# Patient Record
Sex: MALE | Race: ASIAN | NOT HISPANIC OR LATINO
[De-identification: names, ages, dates, MRNs, and addresses within clinical notes are randomized per-mention and may not be internally consistent; named-entity substitution may affect disease eponyms.]

---

## 2019-01-09 ENCOUNTER — APPOINTMENT (OUTPATIENT)
Dept: OPHTHALMOLOGY | Facility: CLINIC | Age: 64
End: 2019-01-09
Payer: COMMERCIAL

## 2019-01-09 DIAGNOSIS — H53.149 VISUAL DISCOMFORT, UNSPECIFIED: ICD-10-CM

## 2019-01-09 DIAGNOSIS — H25.13 AGE-RELATED NUCLEAR CATARACT, BILATERAL: ICD-10-CM

## 2019-01-09 DIAGNOSIS — H40.003 PREGLAUCOMA, UNSPECIFIED, BILATERAL: ICD-10-CM

## 2019-01-09 PROCEDURE — 92133 CPTRZD OPH DX IMG PST SGM ON: CPT

## 2019-01-09 PROCEDURE — 92004 COMPRE OPH EXAM NEW PT 1/>: CPT

## 2020-01-03 ENCOUNTER — RECORD ABSTRACTING (OUTPATIENT)
Age: 65
End: 2020-01-03

## 2020-01-03 DIAGNOSIS — Z80.0 FAMILY HISTORY OF MALIGNANT NEOPLASM OF DIGESTIVE ORGANS: ICD-10-CM

## 2020-01-03 DIAGNOSIS — Z87.19 PERSONAL HISTORY OF OTHER DISEASES OF THE DIGESTIVE SYSTEM: ICD-10-CM

## 2020-01-03 DIAGNOSIS — Z87.891 PERSONAL HISTORY OF NICOTINE DEPENDENCE: ICD-10-CM

## 2020-01-03 DIAGNOSIS — Z82.3 FAMILY HISTORY OF STROKE: ICD-10-CM

## 2020-01-03 LAB
PSA FREE FLD-MCNC: 33
PSA FREE SERPL-MCNC: 0.3
PSA SERPL-MCNC: 0.9
TESTOST BND SERPL-MCNC: 343.8

## 2020-03-16 ENCOUNTER — APPOINTMENT (OUTPATIENT)
Dept: UROLOGY | Facility: CLINIC | Age: 65
End: 2020-03-16
Payer: COMMERCIAL

## 2020-03-16 VITALS
TEMPERATURE: 97 F | BODY MASS INDEX: 23.25 KG/M2 | SYSTOLIC BLOOD PRESSURE: 129 MMHG | HEART RATE: 71 BPM | DIASTOLIC BLOOD PRESSURE: 83 MMHG | WEIGHT: 157 LBS | HEIGHT: 69 IN

## 2020-03-16 DIAGNOSIS — Z78.9 OTHER SPECIFIED HEALTH STATUS: ICD-10-CM

## 2020-03-16 DIAGNOSIS — K21.9 GASTRO-ESOPHAGEAL REFLUX DISEASE W/OUT ESOPHAGITIS: ICD-10-CM

## 2020-03-16 DIAGNOSIS — E78.5 HYPERLIPIDEMIA, UNSPECIFIED: ICD-10-CM

## 2020-03-16 LAB
BILIRUB UR QL STRIP: NORMAL
CLARITY UR: CLEAR
COLLECTION METHOD: NORMAL
GLUCOSE UR-MCNC: NORMAL
HCG UR QL: 0.2 EU/DL
HGB UR QL STRIP.AUTO: NORMAL
KETONES UR-MCNC: NORMAL
LEUKOCYTE ESTERASE UR QL STRIP: NORMAL
NITRITE UR QL STRIP: NORMAL
PH UR STRIP: 5.5
PROT UR STRIP-MCNC: NORMAL
SP GR UR STRIP: 1.02

## 2020-03-16 PROCEDURE — 81003 URINALYSIS AUTO W/O SCOPE: CPT | Mod: NC,QW

## 2020-03-16 PROCEDURE — 99215 OFFICE O/P EST HI 40 MIN: CPT

## 2020-03-16 PROCEDURE — 76857 US EXAM PELVIC LIMITED: CPT

## 2020-03-16 NOTE — HISTORY OF PRESENT ILLNESS
[FreeTextEntry1] : Mr. CHRISTA ORNELAS comes in today for his urologic follow-up.  He presents with moderate chronic LUTS (obstructive and irritative) with nocturia x 3.\par IPSS: 8/35\par Sono:  19cc PVR; 17cc prostate\par \par Mr. Ornelas has a known history of urolithiasis for >20yrs, with approximately two episodes of renal colic, most recently one year ago.  No intervention has been required.  He does have lower back pain is undergoing physical therapy for this\par \par He also has a long history of ED.  He tried Cialis, which was effective, but he does not want to continue with this. He is currently not sexually active.  The Peyronie's has remained stable.\par \par PSAs:  3/22/19--0.6; 2/28/18--0.9; 1/22/16--0.8; 10/14/14--0.7; 12/30/13--0.6; 11/15/11--0.8; \par Renal sono:  4/2/19--New 6mm right mid and upper renal stones.  5mm right lower pole stone.  8mm left lower pole stone.  Bilat renal cysts.\par KUB:  4/2/19--Bilateral faint calcifications seen.\par \par 3/25/19 Stone analysis:  100% Ca++oxalate\par

## 2020-03-16 NOTE — PHYSICAL EXAM
[General Appearance - Well Developed] : well developed [General Appearance - Well Nourished] : well nourished [Normal Appearance] : normal appearance [Well Groomed] : well groomed [General Appearance - In No Acute Distress] : no acute distress [Abdomen Soft] : soft [Abdomen Tenderness] : non-tender [Abdomen Mass (___ Cm)] : no abdominal mass palpated [Abdomen Hernia] : no hernia was discovered [Costovertebral Angle Tenderness] : no ~M costovertebral angle tenderness [Urethral Meatus] : meatus normal [Urinary Bladder Findings] : the bladder was normal on palpation [Scrotum] : the scrotum was normal [Epididymis] : the epididymides were normal [Testes Tenderness] : no tenderness of the testes [Testes Mass (___cm)] : there were no testicular masses [Prostate Tenderness] : the prostate was not tender [No Prostate Nodules] : no prostate nodules [Skin Color & Pigmentation] : normal skin color and pigmentation [Heart Rate And Rhythm] : Heart rate and rhythm were normal [Edema] : no peripheral edema [] : no respiratory distress [Respiration, Rhythm And Depth] : normal respiratory rhythm and effort [Exaggerated Use Of Accessory Muscles For Inspiration] : no accessory muscle use [Oriented To Time, Place, And Person] : oriented to person, place, and time [Affect] : the affect was normal [Mood] : the mood was normal [Not Anxious] : not anxious [Normal Station and Gait] : the gait and station were normal for the patient's age [No Focal Deficits] : no focal deficits [Sensation] : the sensory exam was normal to light touch and pinprick [No Palpable Adenopathy] : no palpable adenopathy [Penis Abnormality] : normal uncircumcised penis

## 2020-03-16 NOTE — REVIEW OF SYSTEMS
[Eyesight Problems] : eyesight problems [Heartburn] : heartburn [see HPI] : see HPI [Joint Pain] : joint pain [Negative] : Heme/Lymph

## 2020-03-16 NOTE — LETTER BODY
[Dear  ___] : Dear  [unfilled], [Consult Letter:] : I had the pleasure of evaluating your patient, [unfilled]. [Please see my note below.] : Please see my note below. [Consult Closing:] : Thank you very much for allowing me to participate in the care of this patient.  If you have any questions, please do not hesitate to contact me. [Sincerely,] : Sincerely, [FreeTextEntry3] : Neville Geronimo MD, FACS\par

## 2020-03-16 NOTE — ASSESSMENT
[FreeTextEntry1] : I discussed the findings and options with Mr. CHRISTA ORNELAS in detail. I reminded him to have a repeat renal sonogram and I will call him with the results.\par \par Providing the PSA is stable and there are no new problems, I look forward to seeing Mr. Ornelas in one year (sono, PSA). \par

## 2020-03-18 LAB
PSA FREE FLD-MCNC: 34 %
PSA FREE SERPL-MCNC: 0.28 NG/ML
PSA SERPL-MCNC: 0.82 NG/ML

## 2020-04-16 ENCOUNTER — APPOINTMENT (OUTPATIENT)
Dept: UROLOGY | Facility: CLINIC | Age: 65
End: 2020-04-16

## 2020-12-21 ENCOUNTER — NON-APPOINTMENT (OUTPATIENT)
Age: 65
End: 2020-12-21

## 2020-12-21 ENCOUNTER — APPOINTMENT (OUTPATIENT)
Dept: OPHTHALMOLOGY | Facility: CLINIC | Age: 65
End: 2020-12-21
Payer: COMMERCIAL

## 2020-12-21 PROCEDURE — 99072 ADDL SUPL MATRL&STAF TM PHE: CPT

## 2020-12-21 PROCEDURE — 92014 COMPRE OPH EXAM EST PT 1/>: CPT

## 2020-12-21 PROCEDURE — 92250 FUNDUS PHOTOGRAPHY W/I&R: CPT

## 2021-02-09 NOTE — PHYSICAL EXAM
[General Appearance - Well Developed] : well developed [General Appearance - Well Nourished] : well nourished [Normal Appearance] : normal appearance [Well Groomed] : well groomed [General Appearance - In No Acute Distress] : no acute distress [Abdomen Soft] : soft [Abdomen Tenderness] : non-tender [Abdomen Hernia] : no hernia was discovered [Abdomen Mass (___ Cm)] : no abdominal mass palpated [Costovertebral Angle Tenderness] : no ~M costovertebral angle tenderness [Urethral Meatus] : meatus normal [Penis Abnormality] : normal uncircumcised penis [Urinary Bladder Findings] : the bladder was normal on palpation [Scrotum] : the scrotum was normal [Epididymis] : the epididymides were normal [Testes Tenderness] : no tenderness of the testes [Testes Mass (___cm)] : there were no testicular masses [Prostate Tenderness] : the prostate was not tender [No Prostate Nodules] : no prostate nodules [Skin Color & Pigmentation] : normal skin color and pigmentation [Heart Rate And Rhythm] : Heart rate and rhythm were normal [Edema] : no peripheral edema [] : no respiratory distress [Respiration, Rhythm And Depth] : normal respiratory rhythm and effort [Exaggerated Use Of Accessory Muscles For Inspiration] : no accessory muscle use [Oriented To Time, Place, And Person] : oriented to person, place, and time [Affect] : the affect was normal [Mood] : the mood was normal [Not Anxious] : not anxious [Normal Station and Gait] : the gait and station were normal for the patient's age [No Focal Deficits] : no focal deficits [Sensation] : the sensory exam was normal to light touch and pinprick [No Palpable Adenopathy] : no palpable adenopathy

## 2021-02-09 NOTE — REVIEW OF SYSTEMS
[Eyesight Problems] : eyesight problems [see HPI] : see HPI [Heartburn] : heartburn [Joint Pain] : joint pain [Negative] : Heme/Lymph

## 2021-02-11 ENCOUNTER — APPOINTMENT (OUTPATIENT)
Dept: UROLOGY | Facility: CLINIC | Age: 66
End: 2021-02-11
Payer: COMMERCIAL

## 2021-02-11 VITALS
HEART RATE: 82 BPM | TEMPERATURE: 97.6 F | OXYGEN SATURATION: 98 % | SYSTOLIC BLOOD PRESSURE: 127 MMHG | DIASTOLIC BLOOD PRESSURE: 81 MMHG

## 2021-02-11 DIAGNOSIS — M54.5 LOW BACK PAIN: ICD-10-CM

## 2021-02-11 LAB
BILIRUB UR QL STRIP: NORMAL
CLARITY UR: CLEAR
COLLECTION METHOD: NORMAL
GLUCOSE UR-MCNC: NORMAL
HCG UR QL: 0.2 EU/DL
HGB UR QL STRIP.AUTO: NORMAL
KETONES UR-MCNC: NORMAL
LEUKOCYTE ESTERASE UR QL STRIP: NORMAL
NITRITE UR QL STRIP: NORMAL
PH UR STRIP: 6
PROT UR STRIP-MCNC: NORMAL
SP GR UR STRIP: 1.02

## 2021-02-11 PROCEDURE — 81003 URINALYSIS AUTO W/O SCOPE: CPT | Mod: QW

## 2021-02-11 PROCEDURE — 99072 ADDL SUPL MATRL&STAF TM PHE: CPT

## 2021-02-11 PROCEDURE — 76857 US EXAM PELVIC LIMITED: CPT

## 2021-02-11 PROCEDURE — 99215 OFFICE O/P EST HI 40 MIN: CPT | Mod: 25

## 2021-02-11 NOTE — ASSESSMENT
[FreeTextEntry1] : I discussed the findings and options with Mr. CHRISTA ORNELAS in detail.  He does not want any intervention for his stable lower urinary tract symptoms.\par \par Mr. Ornelas understands the pros and cons of testosterone testing and supplementation and will forego this.  Instead he will continue with Cialis as needed and I have provided him with a written prescription for it.\par \par Regarding the history of urolithiasis, he was unable to wait in the office for a renal sonogram so I have sent him to an outside office for this. \par \par Providing there are no new problems and the PSA is normal, I look forward to seeing Mr. Ornelas again in 1 year (bladder sono, PSA).\par

## 2021-02-11 NOTE — HISTORY OF PRESENT ILLNESS
[FreeTextEntry1] : Mr. CHRISTA ORNELAS comes in today for his urologic follow-up.  He reports moderate lower urinary tract symptoms(obstructive and irritative) with nocturia x 3 (likely exacerbated by his eating watermelon every evening).\par IPSS: 7/35\par Sono: 69cc PVR; Prostate 21cc\par \par Mr. Ornelas has a known history of urolithiasis for >20yrs, with approximately two episodes of renal colic, most recently ~2 years ago.  No intervention has been required.  He is currently asymptomatic. \par \par He also has a long history of mild ED. He also reports decreased libido.  He tried Cialis, which was effective, but he does not want to continue with this. He is currently not sexually active.  The Peyronie's has remained stable.\par \par PSAs:  3/22/19--0.6; 2/28/18--0.9; 1/22/16--0.8; 10/14/14--0.7; 12/30/13--0.6; 11/15/11--0.8; \par Testosterone: 2/28/18--343\par \par Renal sono:  4/2/19--New 6mm right mid and upper renal stones.  5mm right lower pole stone.  8mm left lower pole stone.  Bilat renal cysts.\par \par KUB:  4/2/19--Bilateral faint calcifications seen.\par \par 3/25/19 Stone analysis:  100% Ca++oxalate\par

## 2021-02-12 LAB — PSA SERPL-MCNC: 0.77 NG/ML

## 2021-03-09 ENCOUNTER — NON-APPOINTMENT (OUTPATIENT)
Age: 66
End: 2021-03-09

## 2021-03-29 NOTE — PHYSICAL EXAM
[General Appearance - Well Developed] : well developed [General Appearance - Well Nourished] : well nourished [Normal Appearance] : normal appearance [Well Groomed] : well groomed [General Appearance - In No Acute Distress] : no acute distress [Heart Rate And Rhythm] : Heart rate and rhythm were normal [Edema] : no peripheral edema [Respiration, Rhythm And Depth] : normal respiratory rhythm and effort [Exaggerated Use Of Accessory Muscles For Inspiration] : no accessory muscle use [Abdomen Soft] : soft [Abdomen Tenderness] : non-tender [Abdomen Mass (___ Cm)] : no abdominal mass palpated [Abdomen Hernia] : no hernia was discovered [Costovertebral Angle Tenderness] : no ~M costovertebral angle tenderness [Urethral Meatus] : meatus normal [Penis Abnormality] : normal uncircumcised penis [Urinary Bladder Findings] : the bladder was normal on palpation [Scrotum] : the scrotum was normal [Epididymis] : the epididymides were normal [Testes Tenderness] : no tenderness of the testes [Testes Mass (___cm)] : there were no testicular masses [Prostate Tenderness] : the prostate was not tender [No Prostate Nodules] : no prostate nodules [Normal Station and Gait] : the gait and station were normal for the patient's age [Skin Color & Pigmentation] : normal skin color and pigmentation [] : no rash [No Focal Deficits] : no focal deficits [Sensation] : the sensory exam was normal to light touch and pinprick [Oriented To Time, Place, And Person] : oriented to person, place, and time [Affect] : the affect was normal [Mood] : the mood was normal [Not Anxious] : not anxious [No Palpable Adenopathy] : no palpable adenopathy

## 2021-03-30 ENCOUNTER — LABORATORY RESULT (OUTPATIENT)
Age: 66
End: 2021-03-30

## 2021-03-31 ENCOUNTER — APPOINTMENT (OUTPATIENT)
Dept: UROLOGY | Facility: CLINIC | Age: 66
End: 2021-03-31
Payer: COMMERCIAL

## 2021-03-31 ENCOUNTER — RESULT CHARGE (OUTPATIENT)
Age: 66
End: 2021-03-31

## 2021-03-31 VITALS
DIASTOLIC BLOOD PRESSURE: 73 MMHG | SYSTOLIC BLOOD PRESSURE: 112 MMHG | OXYGEN SATURATION: 98 % | HEIGHT: 68 IN | HEART RATE: 84 BPM | TEMPERATURE: 98.2 F

## 2021-03-31 PROCEDURE — 99214 OFFICE O/P EST MOD 30 MIN: CPT

## 2021-03-31 PROCEDURE — 99072 ADDL SUPL MATRL&STAF TM PHE: CPT

## 2021-03-31 PROCEDURE — 51798 US URINE CAPACITY MEASURE: CPT

## 2021-03-31 NOTE — ASSESSMENT
[FreeTextEntry1] : I discussed the findings and options with Mr. CHRISTA ORNELAS in detail.  I do not feel his current pain/discomfort is due to the renal stones, rather it is likely neuromuscular.  I reviewed treatment options for these with the risks/benefits, but don't think intervention is required in this setting.\par \par Similarly, he does not require any intervention for his voiding symptoms, unless the urine culture indicates an infection.\par \par Mr. Ornelas has the Cialis RX but has not yet had the opportunity to use it. \par \par Providing that there are no new problems, Mr. Ornelas will return in one year (bladder sono, renal sono, PSA). \par

## 2021-03-31 NOTE — ADDENDUM
[FreeTextEntry1] : A portion of this note was written by [Jarrett Rasmussen] on 03/29/2021 acting as a scribe for Dr. Geronimo. \par \par I have personally reviewed the chart and agree that the record accurately reflects my personal performance of the history, physical exam, assessment, and plan.

## 2021-03-31 NOTE — HISTORY OF PRESENT ILLNESS
[FreeTextEntry1] : Mr. CHRISTA ORNELAS comes in today for follow-up. Approximately 4 weeks ago he experienced recurrent right flank and abdominal pain.  This has persisted and is described as intermittent, dull and localized. It has not effected the GI tract or voiding symptoms. A recent upper endoscopy was reportedly normal.  He denies hematuria or fever. \par \par Mr. Ornelas reports moderate lower urinary tract symptoms(obstructive and irritative) with nocturia x 5 (likely exacerbated by his eating watermelon every evening). He recently experienced transient dysuria. \par IPSS: 18/35\par Sono: 8cc PVR\par \par Mr. Ornelas has a known history of urolithiasis for >20yrs.  \par \par He also has a long history of mild ED. He also reports decreased libido.  He tried Cialis, which was effective, but he does not want to continue with this. He is currently not sexually active.  The Peyronie's has remained stable.\par \par PSAs:  2/12/21--0.77; 3/22/19--0.6; 2/28/18--0.9; 1/22/16--0.8; 10/14/14--0.7; 12/30/13--0.6; 11/15/11--0.8; \par Testosterone: 2/28/18--343\par \par Renal sono:  2/23/21--Bilateral renal cysts. No stones visualized; 4/2/19--New 6mm right mid and upper renal stones.  5mm right lower pole stone.  8mm left lower pole stone.  Bilat renal cysts.\par \par KUB:  4/2/19--Bilateral faint calcifications seen.\par \par Creatinine: 3/2/21--1.04\par \par Stone Analysis: 3/25/19--100% Ca++oxalate\par \par CT: 3/1/21--Bilateral calculi up to 4mm in the right lower pole. No hydronephrosis. \par

## 2022-01-31 ENCOUNTER — APPOINTMENT (OUTPATIENT)
Dept: UROLOGY | Facility: CLINIC | Age: 67
End: 2022-01-31

## 2022-03-07 ENCOUNTER — APPOINTMENT (OUTPATIENT)
Dept: UROLOGY | Facility: CLINIC | Age: 67
End: 2022-03-07

## 2022-03-08 ENCOUNTER — APPOINTMENT (OUTPATIENT)
Dept: UROLOGY | Facility: CLINIC | Age: 67
End: 2022-03-08
Payer: COMMERCIAL

## 2022-03-08 VITALS — DIASTOLIC BLOOD PRESSURE: 63 MMHG | SYSTOLIC BLOOD PRESSURE: 100 MMHG | HEART RATE: 89 BPM | TEMPERATURE: 98 F

## 2022-03-08 PROCEDURE — 76857 US EXAM PELVIC LIMITED: CPT | Mod: 59

## 2022-03-08 PROCEDURE — 99215 OFFICE O/P EST HI 40 MIN: CPT

## 2022-03-08 PROCEDURE — 76775 US EXAM ABDO BACK WALL LIM: CPT

## 2022-03-08 RX ORDER — ROSUVASTATIN CALCIUM 5 MG/1
5 TABLET, FILM COATED ORAL
Qty: 90 | Refills: 0 | Status: ACTIVE | COMMUNITY
Start: 2021-08-03

## 2022-03-08 RX ORDER — ATORVASTATIN CALCIUM 20 MG/1
20 TABLET, FILM COATED ORAL
Refills: 0 | Status: DISCONTINUED | COMMUNITY
End: 2022-03-08

## 2022-03-08 RX ORDER — OMEPRAZOLE 20 MG/1
TABLET, DELAYED RELEASE ORAL
Refills: 0 | Status: ACTIVE | COMMUNITY

## 2022-03-08 RX ORDER — IBUPROFEN 200 MG
20 TABLET ORAL
Refills: 0 | Status: DISCONTINUED | COMMUNITY
End: 2022-03-08

## 2022-03-08 NOTE — ADDENDUM
[FreeTextEntry1] : A portion of this note was written by [Jarrett Rasmussen] on 01/28/2022 acting as a scribe for Dr. Geronimo. \par \par I have personally reviewed the chart and agree that the record accurately reflects my personal performance of the history, physical exam, assessment, and plan.

## 2022-03-08 NOTE — HISTORY OF PRESENT ILLNESS
[FreeTextEntry1] : Mr. CHRISTA ORNELAS comes in today for his annual follow-up. He reports moderate lower urinary tract symptoms(obstructive and irritative) with nocturia x 3. \par IPSS: 11/35\par Sono: 36cc PVR; Prostate 25cc\par \par Mr. Ornelas has a known history of urolithiasis for >20yrs. his last episode of flank pain (right side) was in March 2021 but this was attributed to neuromuscular causes as a prior renal sonogram was negative.  He continues to be asymptomatic.\par \par He also has a long history of mild ED and decreased libido.  He tried Cialis, which was effective, but he does not want to continue with this. He is currently not sexually active.  \par The Peyronie's has remained stable.\par \par PSAs:  2/12/21--0.77; 3/22/19--0.6; 2/28/18--0.9; 1/22/16--0.8; 10/14/14--0.7; 12/30/13--0.6; 11/15/11--0.8; \par Testosterone: 2/28/18--343\par \par Renal sono:  3/8/22--1.1cm right simple renal cyst.  No stones; 2/23/21--Bilateral renal cysts. No stones visualized; 4/2/19--New 6mm right mid and upper renal stones.  5mm right lower pole stone.  8mm left lower pole stone.  Bilat renal cysts.\par \par KUB:  4/2/19--Bilateral faint calcifications seen.\par \par Creatinine: 3/2/21--1.04\par \par Stone Analysis: 3/25/19--100% Ca++oxalate\par \par CT: 3/1/21--Bilateral calculi up to 4mm in the right lower pole. No hydronephrosis. \par

## 2022-03-08 NOTE — ASSESSMENT
[FreeTextEntry1] : I discussed the findings and options with Mr. CHRISTA ORNELAS in detail.  He is doing well overall, Urologically. No intervention is warranted.\par \par Providing that the PSA remains unchanged and there are non new problems, I look forward to seeing Mr. Ornelas in one year (bladder sono, PSA).  A renal sono can be repeated in 2 years.

## 2022-03-09 ENCOUNTER — NON-APPOINTMENT (OUTPATIENT)
Age: 67
End: 2022-03-09

## 2022-03-09 LAB — PSA SERPL-MCNC: 0.83 NG/ML

## 2022-03-10 ENCOUNTER — APPOINTMENT (OUTPATIENT)
Dept: UROLOGY | Facility: CLINIC | Age: 67
End: 2022-03-10

## 2022-04-27 ENCOUNTER — EMERGENCY (EMERGENCY)
Facility: HOSPITAL | Age: 67
LOS: 1 days | Discharge: HOME | End: 2022-04-27
Admitting: EMERGENCY MEDICINE
Payer: COMMERCIAL

## 2022-04-27 VITALS
HEIGHT: 69 IN | WEIGHT: 167.99 LBS | HEART RATE: 87 BPM | DIASTOLIC BLOOD PRESSURE: 85 MMHG | TEMPERATURE: 98 F | OXYGEN SATURATION: 96 % | SYSTOLIC BLOOD PRESSURE: 135 MMHG | RESPIRATION RATE: 18 BRPM

## 2022-04-27 DIAGNOSIS — S09.90XA UNSPECIFIED INJURY OF HEAD, INITIAL ENCOUNTER: ICD-10-CM

## 2022-04-27 DIAGNOSIS — E78.5 HYPERLIPIDEMIA, UNSPECIFIED: ICD-10-CM
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
## 2022-04-27 DIAGNOSIS — S00.83XA CONTUSION OF OTHER PART OF HEAD, INITIAL ENCOUNTER: ICD-10-CM

## 2022-04-27 PROCEDURE — 99283 EMERGENCY DEPT VISIT LOW MDM: CPT

## 2022-04-27 PROCEDURE — 99282 EMERGENCY DEPT VISIT SF MDM: CPT

## 2022-04-27 NOTE — ED PROVIDER NOTE - CLINICAL SUMMARY MEDICAL DECISION MAKING FREE TEXT BOX
67 yo m with pmh of HLD c/o bump to forehead after a 4oz disinfecting spray can fell on his head. No LOC. Reports some mild pain. Denies visual changes, dizziness, n/v, neck pain. No use of blood thinners. Pt went to [facility] and was referred to the ED. 3cm hematoma to L forehead. Neurologically intact. No indication for head Ct based on mechanism and exam. Return precautions discussed.

## 2022-04-27 NOTE — ED ADULT NURSE NOTE - OBJECTIVE STATEMENT
Pt AOX4. Pt reports a 4oz can falling on his head at work today. Pt presents with bump and purple bruising to L frontal side of head. Denies LOC and blood thinners. Pt denies fevers, chills, n/v, SOB, chest pain, numbness, tingling, weakness, dizziness. Pt speaking in full complete sentences. Respirations even and unlabored.

## 2022-04-27 NOTE — ED PROVIDER NOTE - OBJECTIVE STATEMENT
65 yo m with pmh of HLD c/o bump to forehead after a 4oz disinfecting spray can fell on his head. No LOC. Reports some mild pain. Denies visual changes, dizziness, n/v, neck pain. No use of blood thinners. Pt went to [facility] and was referred to the ED.

## 2022-04-27 NOTE — ED PROVIDER NOTE - PHYSICAL EXAMINATION
CONSTITUTIONAL: Well-appearing;  in no apparent distress.   HEAD: Normocephalic; 3cm hematoma to L forehead   EYES: PERRL; EOM intact; conjunctiva and sclera clear  ENT: normal nose; no rhinorrhea; normal pharynx with no erythema or lesions.   NECK: Supple; non-tender;   CARDIOVASCULAR: rrr, no audible murmurs   RESPIRATORY: Breathing easily;   MSK: FROM at all extremities, normal tone   EXT: No cyanosis or edema; N/V intact  SKIN: Normal for age and race; warm; dry; good turgor; no apparent lesions or rash.  AAO x 3, CN II-XII intact, normal speech, strength 5/5 bilateral upper and lower extremities, sensation intact, cerebellum intact, no ataxia, follows commands appropriately

## 2022-04-27 NOTE — ED ADULT TRIAGE NOTE - CHIEF COMPLAINT QUOTE
PT presents reporting a 4 oz can fell on his left forehead from 1 ft above him while he was working today. Pt denies LOC, denies anticoagulants, denies, N/V, denies sensitivity to light. Pt presents w/ lump to left forehead. Pt referred from urgent care for head ct.

## 2022-04-27 NOTE — ED PROVIDER NOTE - PATIENT PORTAL LINK FT
You can access the FollowMyHealth Patient Portal offered by [facility] by registering at the following website: [email or web address]. By joining [facility]’s FollowMyHealth portal, you will also be able to view your health information using other applications (apps) compatible with our system.

## 2022-10-14 ENCOUNTER — APPOINTMENT (OUTPATIENT)
Dept: UROLOGY | Facility: CLINIC | Age: 67
End: 2022-10-14

## 2022-10-14 VITALS
DIASTOLIC BLOOD PRESSURE: 80 MMHG | HEART RATE: 70 BPM | WEIGHT: 165 LBS | TEMPERATURE: 98.2 F | SYSTOLIC BLOOD PRESSURE: 125 MMHG | BODY MASS INDEX: 25.09 KG/M2 | OXYGEN SATURATION: 97 %

## 2022-10-14 PROCEDURE — 81003 URINALYSIS AUTO W/O SCOPE: CPT | Mod: NC,QW

## 2022-10-14 PROCEDURE — 99215 OFFICE O/P EST HI 40 MIN: CPT

## 2022-10-14 PROCEDURE — 76857 US EXAM PELVIC LIMITED: CPT

## 2022-10-16 NOTE — HISTORY OF PRESENT ILLNESS
[FreeTextEntry1] : Mr. CHRISTA ORNELAS comes in today for his annual follow-up. He reports moderate lower urinary tract symptoms(obstructive and irritative) with nocturia x 3. \par IPSS: 13/35\par Sono (performed to assess bladder emptying): 6cc PVR; 16cc prostate\par \par Mr. Ornelas has a known history of urolithiasis for >20yrs. his last episode of flank pain (right side) was in March 2021 but this was attributed to neuromuscular causes as a prior renal sonogram was negative.  He continues to be asymptomatic.\par \par He also has a long history of mild ED and decreased libido.  He tried Cialis, which was effective, but he does not want to continue with this. He is currently not sexually active.  \par The Peyronie's has remained stable.\par \par PSAs:  3/8/22--0.83; 2/12/21--0.77; 3/22/19--0.6; 2/28/18--0.9; 1/22/16--0.8; 10/14/14--0.7; 12/30/13--0.6; 11/15/11--0.8; \par Testosterone: 2/28/18--343\par \par Renal sono:  3/8/22--1.1cm right simple renal cyst.  No stones; 2/23/21--Bilateral renal cysts. No stones visualized; 4/2/19--New 6mm right mid and upper renal stones.  5mm right lower pole stone.  8mm left lower pole stone.  Bilat renal cysts.\par \par KUB:  4/2/19--Bilateral faint calcifications seen.\par \par Creatinine: 3/2/21--1.04\par \par Stone Analysis: 3/25/19--100% Ca++oxalate\par \par CT: 3/1/21--Bilateral calculi up to 4mm in the right lower pole. No hydronephrosis. \par

## 2022-10-16 NOTE — ADDENDUM
[FreeTextEntry1] : A portion of this note was written by [Jarrett Rasmussen] on 10/13/2022 acting as a scribe for Dr. Geronimo. \par \par I have personally reviewed the chart and agree that the record accurately reflects my personal performance of the history, physical exam, assessment, and plan.

## 2022-10-16 NOTE — PHYSICAL EXAM
[General Appearance - Well Developed] : well developed [General Appearance - Well Nourished] : well nourished [Normal Appearance] : normal appearance [Well Groomed] : well groomed [General Appearance - In No Acute Distress] : no acute distress [Abdomen Soft] : soft [Abdomen Tenderness] : non-tender [Abdomen Mass (___ Cm)] : no abdominal mass palpated [Abdomen Hernia] : no hernia was discovered [Costovertebral Angle Tenderness] : no ~M costovertebral angle tenderness [Urethral Meatus] : meatus normal [Penis Abnormality] : normal uncircumcised penis [Urinary Bladder Findings] : the bladder was normal on palpation [Scrotum] : the scrotum was normal [Epididymis] : the epididymides were normal [Testes Tenderness] : no tenderness of the testes [Testes Mass (___cm)] : there were no testicular masses [Prostate Tenderness] : the prostate was not tender [No Prostate Nodules] : no prostate nodules [Skin Color & Pigmentation] : normal skin color and pigmentation [Heart Rate And Rhythm] : Heart rate and rhythm were normal [] : no respiratory distress [Respiration, Rhythm And Depth] : normal respiratory rhythm and effort [Exaggerated Use Of Accessory Muscles For Inspiration] : no accessory muscle use [Oriented To Time, Place, And Person] : oriented to person, place, and time [Affect] : the affect was normal [Mood] : the mood was normal [Not Anxious] : not anxious [Normal Station and Gait] : the gait and station were normal for the patient's age [No Focal Deficits] : no focal deficits [Sensation] : the sensory exam was normal to light touch and pinprick [No Palpable Adenopathy] : no palpable adenopathy [FreeTextEntry1] : Right lower extremity varicosities and swelling

## 2022-10-16 NOTE — ASSESSMENT
[FreeTextEntry1] : I discussed the findings and options with Mr. CHRISTA ORNELAS in detail.  He is doing well overall with his voiding.\par \par Mr. Ornelas does not want any intervention for the ED.\par \par Providing that there are no new problems, I look forward to seeing Mr. Ornelas in one year (bladder sono, PSA). I advised that he has a repeat renal sonogram in one year (prior to seeing us).

## 2024-01-11 PROBLEM — N52.01 ERECTILE DYSFUNCTION DUE TO ARTERIAL INSUFFICIENCY: Status: ACTIVE | Noted: 2020-03-16

## 2024-01-11 PROBLEM — N48.6 PEYRONIE DISEASE: Status: ACTIVE | Noted: 2020-03-16

## 2024-01-11 PROBLEM — N20.9 UROLITHIASIS: Status: ACTIVE | Noted: 2020-03-16

## 2024-01-11 PROBLEM — R39.9 LOWER URINARY TRACT SYMPTOMS (LUTS): Status: ACTIVE | Noted: 2020-03-16

## 2024-01-11 PROBLEM — Z00.00 ENCOUNTER FOR PREVENTIVE HEALTH EXAMINATION: Status: ACTIVE | Noted: 2018-12-27

## 2024-01-12 ENCOUNTER — APPOINTMENT (OUTPATIENT)
Dept: UROLOGY | Facility: CLINIC | Age: 69
End: 2024-01-12
Payer: COMMERCIAL

## 2024-01-12 DIAGNOSIS — N20.9 URINARY CALCULUS, UNSPECIFIED: ICD-10-CM

## 2024-01-12 DIAGNOSIS — R39.9 UNSPECIFIED SYMPTOMS AND SIGNS INVOLVING THE GENITOURINARY SYSTEM: ICD-10-CM

## 2024-01-12 DIAGNOSIS — N52.01 ERECTILE DYSFUNCTION DUE TO ARTERIAL INSUFFICIENCY: ICD-10-CM

## 2024-01-12 DIAGNOSIS — N48.6 INDURATION PENIS PLASTICA: ICD-10-CM

## 2024-01-12 DIAGNOSIS — Z00.00 ENCOUNTER FOR GENERAL ADULT MEDICAL EXAMINATION W/OUT ABNORMAL FINDINGS: ICD-10-CM

## 2024-01-12 PROCEDURE — 51798 US URINE CAPACITY MEASURE: CPT

## 2024-01-12 PROCEDURE — 99213 OFFICE O/P EST LOW 20 MIN: CPT | Mod: 25

## 2024-01-12 NOTE — ASSESSMENT
[FreeTextEntry1] : I discussed the findings and options with Mr. CHRISTA ORNELAS in detail.  Regarding his voiding symptoms, his main complaint is nocturia and I discussed behavioral modification with him to address this.  A PSA is pending and, providing this remains stable, I look forward to seeing Mr. Ornelas in 1 year (bladder sono, PSA).  Because of his prior history of urolithiasis he should repeat a renal sonogram in 2 years.

## 2024-01-12 NOTE — HISTORY OF PRESENT ILLNESS
[FreeTextEntry1] : Mr. CHRISTA ORNELAS returns for an annual follow-up. He reports moderate lower urinary tract symptoms(obstructive and irritative) with nocturia x 3.  IPSS: 9/3 Sono (performed to assess bladder emptying): Nil PVR  Mr. Ornelas has a known history of urolithiasis for >20yrs. his last episode of flank pain (right side) was in March 2021 but this was attributed to neuromuscular causes as a prior renal sonogram was negative.  He continues to be asymptomatic.  He also has a long history of mild ED and decreased libido.  He tried Cialis, which was effective, but he does not want to continue with this. He is currently not sexually active.   The Peyronie's has remained stable.  PSAs:  3/8/22--0.83; 2/12/21--0.77; 3/22/19--0.6; 2/28/18--0.9; 1/22/16--0.8; 10/14/14--0.7; 12/30/13--0.6; 11/15/11--0.8  Testosterone: 2/28/18--343  Renal sono:  1/2/24-- 1 + 1.4cm simple right renal cysts.  Echogenic focus in the left upper and midpole kidney (8 and 5 mm, respectively). No stones.  3/8/22--1.1cm right simple renal cyst.  No stones; 2/23/21--Bilateral renal cysts. No stones visualized; 4/2/19--New 6mm right mid and upper renal stones.  5mm right lower pole stone.  8mm left lower pole stone.  Bilat renal cysts.  KUB:  4/2/19--Bilateral faint calcifications seen.  Creatinine: 3/2/21--1.04  Stone Analysis: 3/25/19--100% Ca++oxalate  CT: 3/1/21--Bilateral calculi up to 4mm in the right lower pole. No hydronephrosis.

## 2024-01-15 LAB — PSA SERPL-MCNC: 0.96 NG/ML

## 2024-01-17 ENCOUNTER — NON-APPOINTMENT (OUTPATIENT)
Age: 69
End: 2024-01-17

## 2024-02-07 NOTE — ED ADULT NURSE NOTE - CHIEF COMPLAINT QUOTE
PT presents reporting a 4 oz can fell on his left forehead from 1 ft above him while he was working today. Pt denies LOC, denies anticoagulants, denies, N/V, denies sensitivity to light. Pt presents w/ lump to left forehead. Pt referred from urgent care for head ct. [Negative] : Endocrine [FreeTextEntry8] : urinary retention, pelvic pain

## 2024-05-17 ENCOUNTER — APPOINTMENT (OUTPATIENT)
Dept: UROLOGY | Facility: CLINIC | Age: 69
End: 2024-05-17

## 2025-01-08 ENCOUNTER — APPOINTMENT (OUTPATIENT)
Dept: UROLOGY | Facility: CLINIC | Age: 70
End: 2025-01-08
Payer: COMMERCIAL

## 2025-01-08 VITALS
DIASTOLIC BLOOD PRESSURE: 87 MMHG | OXYGEN SATURATION: 98 % | TEMPERATURE: 97.9 F | SYSTOLIC BLOOD PRESSURE: 146 MMHG | HEART RATE: 79 BPM

## 2025-01-08 DIAGNOSIS — N52.01 ERECTILE DYSFUNCTION DUE TO ARTERIAL INSUFFICIENCY: ICD-10-CM

## 2025-01-08 DIAGNOSIS — R68.82 DECREASED LIBIDO: ICD-10-CM

## 2025-01-08 DIAGNOSIS — N48.6 INDURATION PENIS PLASTICA: ICD-10-CM

## 2025-01-08 DIAGNOSIS — N20.9 URINARY CALCULUS, UNSPECIFIED: ICD-10-CM

## 2025-01-08 DIAGNOSIS — Z12.5 ENCOUNTER FOR SCREENING FOR MALIGNANT NEOPLASM OF PROSTATE: ICD-10-CM

## 2025-01-08 DIAGNOSIS — R39.9 UNSPECIFIED SYMPTOMS AND SIGNS INVOLVING THE GENITOURINARY SYSTEM: ICD-10-CM

## 2025-01-08 PROCEDURE — 51798 US URINE CAPACITY MEASURE: CPT

## 2025-01-08 PROCEDURE — 99213 OFFICE O/P EST LOW 20 MIN: CPT | Mod: 25

## 2025-01-08 PROCEDURE — 51741 ELECTRO-UROFLOWMETRY FIRST: CPT

## 2025-01-08 PROCEDURE — G2211 COMPLEX E/M VISIT ADD ON: CPT | Mod: NC
